# Patient Record
Sex: FEMALE | Race: WHITE | NOT HISPANIC OR LATINO
[De-identification: names, ages, dates, MRNs, and addresses within clinical notes are randomized per-mention and may not be internally consistent; named-entity substitution may affect disease eponyms.]

---

## 2016-12-20 RX ORDER — SCOPALAMINE 1 MG/3D
1.5 PATCH, EXTENDED RELEASE TRANSDERMAL ONCE
Qty: 0 | Refills: 0 | Status: COMPLETED | OUTPATIENT
Start: 2017-01-04 | End: 2017-01-04

## 2016-12-20 RX ORDER — CELECOXIB 200 MG/1
400 CAPSULE ORAL ONCE
Qty: 0 | Refills: 0 | Status: COMPLETED | OUTPATIENT
Start: 2017-01-04 | End: 2017-01-04

## 2016-12-20 RX ORDER — GABAPENTIN 400 MG/1
300 CAPSULE ORAL ONCE
Qty: 0 | Refills: 0 | Status: COMPLETED | OUTPATIENT
Start: 2017-01-04 | End: 2017-01-04

## 2016-12-21 RX ORDER — OXYCODONE HYDROCHLORIDE 5 MG/1
10 TABLET ORAL ONCE
Qty: 0 | Refills: 0 | Status: DISCONTINUED | OUTPATIENT
Start: 2017-01-04 | End: 2017-01-04

## 2017-01-03 ENCOUNTER — RESULT REVIEW (OUTPATIENT)
Age: 62
End: 2017-01-03

## 2017-01-04 ENCOUNTER — INPATIENT (INPATIENT)
Facility: HOSPITAL | Age: 62
LOS: 1 days | Discharge: HOME | DRG: 470 | End: 2017-01-06
Attending: ORTHOPAEDIC SURGERY | Admitting: ORTHOPAEDIC SURGERY
Payer: COMMERCIAL

## 2017-01-04 ENCOUNTER — APPOINTMENT (OUTPATIENT)
Dept: ORTHOPEDIC SURGERY | Facility: HOSPITAL | Age: 62
End: 2017-01-04

## 2017-01-04 ENCOUNTER — TRANSCRIPTION ENCOUNTER (OUTPATIENT)
Age: 62
End: 2017-01-04

## 2017-01-04 VITALS
DIASTOLIC BLOOD PRESSURE: 66 MMHG | OXYGEN SATURATION: 100 % | TEMPERATURE: 98 F | RESPIRATION RATE: 16 BRPM | HEART RATE: 70 BPM | WEIGHT: 190.04 LBS | HEIGHT: 63 IN | SYSTOLIC BLOOD PRESSURE: 113 MMHG

## 2017-01-04 DIAGNOSIS — Z96.7 PRESENCE OF OTHER BONE AND TENDON IMPLANTS: Chronic | ICD-10-CM

## 2017-01-04 DIAGNOSIS — I95.81 POSTPROCEDURAL HYPOTENSION: ICD-10-CM

## 2017-01-04 DIAGNOSIS — Z29.9 ENCOUNTER FOR PROPHYLACTIC MEASURES, UNSPECIFIED: ICD-10-CM

## 2017-01-04 DIAGNOSIS — Z98.891 HISTORY OF UTERINE SCAR FROM PREVIOUS SURGERY: Chronic | ICD-10-CM

## 2017-01-04 DIAGNOSIS — M16.11 UNILATERAL PRIMARY OSTEOARTHRITIS, RIGHT HIP: ICD-10-CM

## 2017-01-04 DIAGNOSIS — Z96.641 PRESENCE OF RIGHT ARTIFICIAL HIP JOINT: ICD-10-CM

## 2017-01-04 DIAGNOSIS — M85.80 OTHER SPECIFIED DISORDERS OF BONE DENSITY AND STRUCTURE, UNSPECIFIED SITE: ICD-10-CM

## 2017-01-04 DIAGNOSIS — R79.1 ABNORMAL COAGULATION PROFILE: ICD-10-CM

## 2017-01-04 PROCEDURE — 27130 TOTAL HIP ARTHROPLASTY: CPT | Mod: AS,RT

## 2017-01-04 PROCEDURE — 76001: CPT | Mod: 26,59

## 2017-01-04 PROCEDURE — 88311 DECALCIFY TISSUE: CPT | Mod: 26

## 2017-01-04 PROCEDURE — 73502 X-RAY EXAM HIP UNI 2-3 VIEWS: CPT | Mod: 26,RT

## 2017-01-04 PROCEDURE — 99223 1ST HOSP IP/OBS HIGH 75: CPT

## 2017-01-04 PROCEDURE — 27130 TOTAL HIP ARTHROPLASTY: CPT | Mod: RT

## 2017-01-04 PROCEDURE — 88305 TISSUE EXAM BY PATHOLOGIST: CPT | Mod: 26

## 2017-01-04 RX ORDER — SODIUM CHLORIDE 9 MG/ML
1000 INJECTION, SOLUTION INTRAVENOUS
Qty: 0 | Refills: 0 | Status: DISCONTINUED | OUTPATIENT
Start: 2017-01-04 | End: 2017-01-05

## 2017-01-04 RX ORDER — VANCOMYCIN HCL 1 G
1250 VIAL (EA) INTRAVENOUS ONCE
Qty: 0 | Refills: 0 | Status: COMPLETED | OUTPATIENT
Start: 2017-01-04 | End: 2017-01-04

## 2017-01-04 RX ORDER — ONDANSETRON 8 MG/1
4 TABLET, FILM COATED ORAL ONCE
Qty: 0 | Refills: 0 | Status: DISCONTINUED | OUTPATIENT
Start: 2017-01-04 | End: 2017-01-04

## 2017-01-04 RX ORDER — OXYCODONE HYDROCHLORIDE 5 MG/1
10 TABLET ORAL
Qty: 0 | Refills: 0 | Status: DISCONTINUED | OUTPATIENT
Start: 2017-01-04 | End: 2017-01-06

## 2017-01-04 RX ORDER — OXYCODONE HYDROCHLORIDE 5 MG/1
5 TABLET ORAL
Qty: 0 | Refills: 0 | Status: DISCONTINUED | OUTPATIENT
Start: 2017-01-04 | End: 2017-01-06

## 2017-01-04 RX ORDER — VANCOMYCIN HCL 1 G
1000 VIAL (EA) INTRAVENOUS
Qty: 0 | Refills: 0 | Status: COMPLETED | OUTPATIENT
Start: 2017-01-04 | End: 2017-01-04

## 2017-01-04 RX ORDER — HYDROMORPHONE HYDROCHLORIDE 2 MG/ML
2 INJECTION INTRAMUSCULAR; INTRAVENOUS; SUBCUTANEOUS
Qty: 0 | Refills: 0 | Status: DISCONTINUED | OUTPATIENT
Start: 2017-01-04 | End: 2017-01-06

## 2017-01-04 RX ORDER — ASPIRIN/CALCIUM CARB/MAGNESIUM 324 MG
325 TABLET ORAL
Qty: 0 | Refills: 0 | Status: DISCONTINUED | OUTPATIENT
Start: 2017-01-04 | End: 2017-01-06

## 2017-01-04 RX ORDER — CEFAZOLIN SODIUM 1 G
2000 VIAL (EA) INJECTION
Qty: 0 | Refills: 0 | Status: COMPLETED | OUTPATIENT
Start: 2017-01-04 | End: 2017-01-04

## 2017-01-04 RX ORDER — ACETAMINOPHEN 500 MG
650 TABLET ORAL EVERY 6 HOURS
Qty: 0 | Refills: 0 | Status: DISCONTINUED | OUTPATIENT
Start: 2017-01-04 | End: 2017-01-06

## 2017-01-04 RX ORDER — CELECOXIB 200 MG/1
200 CAPSULE ORAL
Qty: 0 | Refills: 0 | Status: DISCONTINUED | OUTPATIENT
Start: 2017-01-04 | End: 2017-01-06

## 2017-01-04 RX ORDER — TRANEXAMIC ACID 100 MG/ML
900 INJECTION, SOLUTION INTRAVENOUS ONCE
Qty: 0 | Refills: 0 | Status: DISCONTINUED | OUTPATIENT
Start: 2017-01-04 | End: 2017-01-04

## 2017-01-04 RX ORDER — ONDANSETRON 8 MG/1
4 TABLET, FILM COATED ORAL EVERY 6 HOURS
Qty: 0 | Refills: 0 | Status: DISCONTINUED | OUTPATIENT
Start: 2017-01-04 | End: 2017-01-06

## 2017-01-04 RX ORDER — CEFAZOLIN SODIUM 1 G
2000 VIAL (EA) INJECTION ONCE
Qty: 0 | Refills: 0 | Status: DISCONTINUED | OUTPATIENT
Start: 2017-01-04 | End: 2017-01-04

## 2017-01-04 RX ORDER — KETOROLAC TROMETHAMINE 30 MG/ML
15 SYRINGE (ML) INJECTION EVERY 6 HOURS
Qty: 0 | Refills: 0 | Status: DISCONTINUED | OUTPATIENT
Start: 2017-01-04 | End: 2017-01-05

## 2017-01-04 RX ORDER — ACETAMINOPHEN 500 MG
1000 TABLET ORAL ONCE
Qty: 0 | Refills: 0 | Status: DISCONTINUED | OUTPATIENT
Start: 2017-01-04 | End: 2017-01-04

## 2017-01-04 RX ORDER — OXYCODONE HYDROCHLORIDE 5 MG/1
10 TABLET ORAL EVERY 12 HOURS
Qty: 0 | Refills: 0 | Status: DISCONTINUED | OUTPATIENT
Start: 2017-01-04 | End: 2017-01-06

## 2017-01-04 RX ORDER — SODIUM CHLORIDE 9 MG/ML
1000 INJECTION, SOLUTION INTRAVENOUS
Qty: 0 | Refills: 0 | Status: DISCONTINUED | OUTPATIENT
Start: 2017-01-04 | End: 2017-01-04

## 2017-01-04 RX ORDER — SODIUM CHLORIDE 9 MG/ML
3 INJECTION INTRAMUSCULAR; INTRAVENOUS; SUBCUTANEOUS EVERY 8 HOURS
Qty: 0 | Refills: 0 | Status: DISCONTINUED | OUTPATIENT
Start: 2017-01-04 | End: 2017-01-04

## 2017-01-04 RX ORDER — SENNA PLUS 8.6 MG/1
2 TABLET ORAL AT BEDTIME
Qty: 0 | Refills: 0 | Status: DISCONTINUED | OUTPATIENT
Start: 2017-01-04 | End: 2017-01-06

## 2017-01-04 RX ORDER — LORATADINE 10 MG/1
10 TABLET ORAL DAILY
Qty: 0 | Refills: 0 | Status: DISCONTINUED | OUTPATIENT
Start: 2017-01-04 | End: 2017-01-06

## 2017-01-04 RX ORDER — HYDROMORPHONE HYDROCHLORIDE 2 MG/ML
0.5 INJECTION INTRAMUSCULAR; INTRAVENOUS; SUBCUTANEOUS
Qty: 0 | Refills: 0 | Status: DISCONTINUED | OUTPATIENT
Start: 2017-01-04 | End: 2017-01-06

## 2017-01-04 RX ORDER — ACETAMINOPHEN 500 MG
975 TABLET ORAL EVERY 8 HOURS
Qty: 0 | Refills: 0 | Status: DISCONTINUED | OUTPATIENT
Start: 2017-01-04 | End: 2017-01-06

## 2017-01-04 RX ORDER — DOCUSATE SODIUM 100 MG
100 CAPSULE ORAL THREE TIMES A DAY
Qty: 0 | Refills: 0 | Status: DISCONTINUED | OUTPATIENT
Start: 2017-01-04 | End: 2017-01-06

## 2017-01-04 RX ORDER — ACETAMINOPHEN 500 MG
1000 TABLET ORAL
Qty: 0 | Refills: 0 | Status: COMPLETED | OUTPATIENT
Start: 2017-01-04 | End: 2017-01-04

## 2017-01-04 RX ORDER — FENTANYL CITRATE 50 UG/ML
50 INJECTION INTRAVENOUS
Qty: 0 | Refills: 0 | Status: DISCONTINUED | OUTPATIENT
Start: 2017-01-04 | End: 2017-01-04

## 2017-01-04 RX ADMIN — SCOPALAMINE 1.5 MILLIGRAM(S): 1 PATCH, EXTENDED RELEASE TRANSDERMAL at 06:47

## 2017-01-04 RX ADMIN — CELECOXIB 200 MILLIGRAM(S): 200 CAPSULE ORAL at 18:14

## 2017-01-04 RX ADMIN — Medication 15 MILLIGRAM(S): at 23:57

## 2017-01-04 RX ADMIN — HYDROMORPHONE HYDROCHLORIDE 0.5 MILLIGRAM(S): 2 INJECTION INTRAMUSCULAR; INTRAVENOUS; SUBCUTANEOUS at 15:34

## 2017-01-04 RX ADMIN — Medication 100 MILLIGRAM(S): at 23:52

## 2017-01-04 RX ADMIN — OXYCODONE HYDROCHLORIDE 10 MILLIGRAM(S): 5 TABLET ORAL at 13:52

## 2017-01-04 RX ADMIN — OXYCODONE HYDROCHLORIDE 10 MILLIGRAM(S): 5 TABLET ORAL at 18:16

## 2017-01-04 RX ADMIN — Medication 250 MILLIGRAM(S): at 21:40

## 2017-01-04 RX ADMIN — OXYCODONE HYDROCHLORIDE 10 MILLIGRAM(S): 5 TABLET ORAL at 06:45

## 2017-01-04 RX ADMIN — FENTANYL CITRATE 50 MICROGRAM(S): 50 INJECTION INTRAVENOUS at 12:23

## 2017-01-04 RX ADMIN — FENTANYL CITRATE 50 MICROGRAM(S): 50 INJECTION INTRAVENOUS at 12:13

## 2017-01-04 RX ADMIN — Medication 166.67 MILLIGRAM(S): at 07:17

## 2017-01-04 RX ADMIN — Medication 100 MILLIGRAM(S): at 16:28

## 2017-01-04 RX ADMIN — Medication 325 MILLIGRAM(S): at 18:14

## 2017-01-04 RX ADMIN — OXYCODONE HYDROCHLORIDE 10 MILLIGRAM(S): 5 TABLET ORAL at 18:14

## 2017-01-04 RX ADMIN — Medication 1000 MILLIGRAM(S): at 08:15

## 2017-01-04 RX ADMIN — OXYCODONE HYDROCHLORIDE 5 MILLIGRAM(S): 5 TABLET ORAL at 21:40

## 2017-01-04 RX ADMIN — SODIUM CHLORIDE 125 MILLILITER(S): 9 INJECTION, SOLUTION INTRAVENOUS at 18:49

## 2017-01-04 RX ADMIN — FENTANYL CITRATE 50 MICROGRAM(S): 50 INJECTION INTRAVENOUS at 16:46

## 2017-01-04 RX ADMIN — OXYCODONE HYDROCHLORIDE 10 MILLIGRAM(S): 5 TABLET ORAL at 18:44

## 2017-01-04 RX ADMIN — CELECOXIB 200 MILLIGRAM(S): 200 CAPSULE ORAL at 18:47

## 2017-01-04 RX ADMIN — HYDROMORPHONE HYDROCHLORIDE 0.5 MILLIGRAM(S): 2 INJECTION INTRAMUSCULAR; INTRAVENOUS; SUBCUTANEOUS at 15:20

## 2017-01-04 RX ADMIN — CELECOXIB 400 MILLIGRAM(S): 200 CAPSULE ORAL at 06:47

## 2017-01-04 RX ADMIN — OXYCODONE HYDROCHLORIDE 5 MILLIGRAM(S): 5 TABLET ORAL at 22:40

## 2017-01-04 RX ADMIN — Medication 400 MILLIGRAM(S): at 19:52

## 2017-01-04 RX ADMIN — OXYCODONE HYDROCHLORIDE 10 MILLIGRAM(S): 5 TABLET ORAL at 13:42

## 2017-01-04 RX ADMIN — GABAPENTIN 300 MILLIGRAM(S): 400 CAPSULE ORAL at 06:47

## 2017-01-04 RX ADMIN — FENTANYL CITRATE 50 MICROGRAM(S): 50 INJECTION INTRAVENOUS at 16:36

## 2017-01-04 NOTE — CONSULT NOTE ADULT - SUBJECTIVE AND OBJECTIVE BOX
CC: Right hip pain x2 years    HPI:  62 y/o female with no PMH presents to [facility] with right hip pain x 2 years secondary to OA, s/p right BANDAR anterior approach POD #0.      PAST MEDICAL & SURGICAL HISTORY:  Osteopenia  Osteoarthritis  S/P ORIF (open reduction internal fixation) fracture: R ankle  S/P  section    FAMILY HISTORY:  No pertinent family history.    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    ALLERGIES:   NKDA    HOME MEDICATIONS:  Turmeric 1 tab orally once a day  Tart cherry daily     Probiotic Formula 1 cap orally once a day  Calcium 500+D oral tablet 1 tab orally once a day  Multivitamin 1 tab orally once a day  ZyrTEC 10 mg orally once a day As Needed    MEDICATIONS  (STANDING):  lactated ringers. 1000milliLiter(s) IV Continuous <Continuous>    MEDICATIONS  (PRN):  fentaNYL    Injectable 50MICROGram(s) IV Push every 5 minutes PRN Severe Pain  ondansetron Injectable 4milliGRAM(s) IV Push once PRN Nausea and/or Vomiting    REVIEW OF SYSTEMS:  CONSTITUTIONAL: No fever, weight loss, or fatigue  EYES: No eye pain, visual disturbances, or discharge  ENMT:  No difficulty hearing, tinnitus, vertigo; No sinus or throat pain  NECK: No pain or stiffness  RESPIRATORY: No cough, wheezing, chills or hemoptysis; No shortness of breath  CARDIOVASCULAR: No chest pain, palpitations, or leg swelling  GASTROINTESTINAL: No abdominal or epigastric pain. No nausea, vomiting, or hematemesis; No diarrhea or constipation. No melena or hematochezia.  GENITOURINARY: No dysuria, frequency, hematuria, or incontinence  NEUROLOGICAL: (+) Headache; No memory loss, loss of strength, numbness, or tremors  SKIN: No itching, burning, rashes, or lesions   LYMPH NODES: No enlarged glands  ENDOCRINE: No heat or cold intolerance; No hair loss  MUSCULOSKELETAL: No joint swelling; (+) Right hip pain; (+) back pain  PSYCHIATRIC: No depression, anxiety, mood swings, or difficulty sleeping  HEME/LYMPH: No easy bruising, or bleeding gums  ALLERGY AND IMMUNOLOGIC: No hives or eczema       Vital Signs Last 24 Hrs  T(C): 36.9, Max: 36.9 ( @ 10:57)  T(F): 98.4, Max: 98.4 (- @ 10:57)  HR: 61 (53 - 81)  BP: 102/64 (88/52 - 119/86)  BP(mean): --  RR: 16 (15 - 16)  SpO2: 97% (97% - 100%)    PHYSICAL EXAM:  GENERAL: NAD, well-groomed, well-developed  HEAD:  Atraumatic, Normocephalic  EYES: EOMI, PERRLA, conjunctiva and sclera clear  NECK: Supple, No JVD, Normal thyroid  NERVOUS SYSTEM:  Alert & Oriented X3, Good concentration; Motor Strength 5/5 B/L upper and lower extremities  CHEST/LUNG: Clear to auscultation bilaterally; No rales, rhonchi, wheezing, or rubs  HEART: Regular rate and rhythm; No murmurs, rubs, or gallops  ABDOMEN: Soft, Nontender, Nondistended; Bowel sounds present  EXTREMITIES:  2+ Peripheral Pulses, No clubbing, cyanosis, or edema; Right Hip with clean dressing.  LYMPH: No lymphadenopathy noted  SKIN: No rashes or lesions      LABS:  Complete Blood Count + Automated Diff (16 @ 11:29)    WBC Count: 5.21 K/uL    RBC Count: 4.58 M/uL    Hemoglobin: 13.4 g/dL    Hematocrit: 40.5 %    Mean Cell Volume: 88.4 fl    Mean Cell Hemoglobin: 29.3 pg    Mean Cell Hemoglobin Conc: 33.1 g/dL    Red Cell Distrib Width: 13.3 %    Platelet Count - Automated: 235 K/uL    Auto Neutrophil #: 2.8 K/uL    Auto Lymphocyte #: 1.8 K/uL    Auto Monocyte #: 0.3 K/uL    Auto Eosinophil #: 0.2 K/uL    Auto Basophil #: 0.0 K/uL    Auto Neutrophil %: 54.0: Differential percentages must be correlated with absolute numbers for  clinical significance. %    Auto Lymphocyte %: 35.3 %    Auto Monocyte %: 6.1 %    Auto Eosinophil %: 3.6 %    Auto Basophil %: 0.8 %    Prothrombin Time and INR, Plasma (16 @ 11:29)    Prothrombin Time, Plasma: 13.3 sec    INR: 1.21    Comprehensive Metabolic Panel (16 @ 11:29)    Sodium, Serum: 140 mmol/L    Potassium, Serum: 4.7 mmol/L    Chloride, Serum: 100 mmol/L    Carbon Dioxide, Serum: 26.0 mmol/L    Anion Gap, Serum: 14 mmol/L    Blood Urea Nitrogen, Serum: 17.0 mg/dL    Creatinine, Serum: 0.55 mg/dL    Glucose, Serum: 107 mg/dL    Calcium, Total Serum: 9.7 mg/dL    Protein Total, Serum: 7.7 g/dL    Albumin, Serum: 4.5 g/dL    Bilirubin Total, Serum: 0.6 mg/dL    Alkaline Phosphatase, Serum: 64 U/L    Aspartate Aminotransferase (AST/SGOT): 31 U/L    Alanine Aminotransferase (ALT/SGPT): 28 U/L      RADIOLOGY & ADDITIONAL STUDIES:  Right Hip x-ray  IMPRESSION: 2 portable views demonstrate baseline exam status post total   right hip arthroplasty and acetabular screw placement in good alignment.   There are sclerotic changes of the left greater trochanter which may be   posttraumatic, comparison to prior studies PMD: Rudy    CC: Right hip pain x2 years    HPI:  62 y/o female with no PMH presents to [facility] with right hip pain x 2 years secondary to OA, s/p right BANDAR anterior approach POD #0.      PAST MEDICAL & SURGICAL HISTORY:  Osteopenia  Osteoarthritis  S/P ORIF (open reduction internal fixation) fracture: R ankle  S/P  section    FAMILY HISTORY:  No pertinent family history.    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    ALLERGIES:   NKDA    HOME MEDICATIONS:  Turmeric 1 tab orally once a day  Tart cherry daily     Probiotic Formula 1 cap orally once a day  Calcium 500+D oral tablet 1 tab orally once a day  Multivitamin 1 tab orally once a day  ZyrTEC 10 mg orally once a day As Needed    MEDICATIONS  (STANDING):  lactated ringers. 1000milliLiter(s) IV Continuous <Continuous>    MEDICATIONS  (PRN):  fentaNYL    Injectable 50MICROGram(s) IV Push every 5 minutes PRN Severe Pain  ondansetron Injectable 4milliGRAM(s) IV Push once PRN Nausea and/or Vomiting    REVIEW OF SYSTEMS:  CONSTITUTIONAL: No fever, weight loss, or fatigue  EYES: No eye pain, visual disturbances, or discharge  ENMT:  No difficulty hearing, tinnitus, vertigo; No sinus or throat pain  NECK: No pain or stiffness  RESPIRATORY: No cough, wheezing, chills or hemoptysis; No shortness of breath  CARDIOVASCULAR: No chest pain, palpitations, or leg swelling  GASTROINTESTINAL: No abdominal or epigastric pain. No nausea, vomiting, or hematemesis; No diarrhea or constipation. No melena or hematochezia.  GENITOURINARY: No dysuria, frequency, hematuria, or incontinence  NEUROLOGICAL: (+) Headache; No memory loss, loss of strength, numbness, or tremors  SKIN: No itching, burning, rashes, or lesions   LYMPH NODES: No enlarged glands  ENDOCRINE: No heat or cold intolerance; No hair loss  MUSCULOSKELETAL: No joint swelling; (+) Right hip pain; (+) back pain  PSYCHIATRIC: No depression, anxiety, mood swings, or difficulty sleeping  HEME/LYMPH: No easy bruising, or bleeding gums  ALLERGY AND IMMUNOLOGIC: No hives or eczema       Vital Signs Last 24 Hrs  T(C): 36.9, Max: 36.9 ( @ 10:57)  T(F): 98.4, Max: 98.4 (01-04 @ 10:57)  HR: 61 (53 - 81)  BP: 102/64 (88/52 - 119/86)  BP(mean): --  RR: 16 (15 - 16)  SpO2: 97% (97% - 100%)    PHYSICAL EXAM:  GENERAL: NAD, well-groomed, well-developed  HEAD:  Atraumatic, Normocephalic  EYES: EOMI, PERRLA, conjunctiva and sclera clear  NECK: Supple, No JVD, Normal thyroid  NERVOUS SYSTEM:  Alert & Oriented X3, Good concentration; Motor Strength 5/5 B/L upper and lower extremities  CHEST/LUNG: Clear to auscultation bilaterally; No rales, rhonchi, wheezing, or rubs  HEART: Regular rate and rhythm; No murmurs, rubs, or gallops  ABDOMEN: Soft, Nontender, Nondistended; Bowel sounds present  EXTREMITIES:  2+ Peripheral Pulses, No clubbing, cyanosis, or edema; Right Hip with clean dressing.  LYMPH: No lymphadenopathy noted  SKIN: No rashes or lesions      LABS:  Complete Blood Count + Automated Diff (16 @ 11:29)    WBC Count: 5.21 K/uL    RBC Count: 4.58 M/uL    Hemoglobin: 13.4 g/dL    Hematocrit: 40.5 %    Mean Cell Volume: 88.4 fl    Mean Cell Hemoglobin: 29.3 pg    Mean Cell Hemoglobin Conc: 33.1 g/dL    Red Cell Distrib Width: 13.3 %    Platelet Count - Automated: 235 K/uL    Auto Neutrophil #: 2.8 K/uL    Auto Lymphocyte #: 1.8 K/uL    Auto Monocyte #: 0.3 K/uL    Auto Eosinophil #: 0.2 K/uL    Auto Basophil #: 0.0 K/uL    Auto Neutrophil %: 54.0: Differential percentages must be correlated with absolute numbers for  clinical significance. %    Auto Lymphocyte %: 35.3 %    Auto Monocyte %: 6.1 %    Auto Eosinophil %: 3.6 %    Auto Basophil %: 0.8 %    Prothrombin Time and INR, Plasma (16 @ 11:29)    Prothrombin Time, Plasma: 13.3 sec    INR: 1.21    Comprehensive Metabolic Panel (16 @ 11:29)    Sodium, Serum: 140 mmol/L    Potassium, Serum: 4.7 mmol/L    Chloride, Serum: 100 mmol/L    Carbon Dioxide, Serum: 26.0 mmol/L    Anion Gap, Serum: 14 mmol/L    Blood Urea Nitrogen, Serum: 17.0 mg/dL    Creatinine, Serum: 0.55 mg/dL    Glucose, Serum: 107 mg/dL    Calcium, Total Serum: 9.7 mg/dL    Protein Total, Serum: 7.7 g/dL    Albumin, Serum: 4.5 g/dL    Bilirubin Total, Serum: 0.6 mg/dL    Alkaline Phosphatase, Serum: 64 U/L    Aspartate Aminotransferase (AST/SGOT): 31 U/L    Alanine Aminotransferase (ALT/SGPT): 28 U/L      RADIOLOGY & ADDITIONAL STUDIES:  Right Hip x-ray  IMPRESSION: 2 portable views demonstrate baseline exam status post total   right hip arthroplasty and acetabular screw placement in good alignment.   There are sclerotic changes of the left greater trochanter which may be   posttraumatic, comparison to prior studies PMD: Rudy    CC: Right hip pain x 2 years    HPI:  60 y/o female with no PMH presents to [facility] with right hip pain x 2 years secondary to OA, s/p right BANDAR anterior approach POD #0.      PAST MEDICAL & SURGICAL HISTORY:    Osteopenia  Osteoarthritis  S/P ORIF (open reduction internal fixation) fracture: R ankle  S/P  section    FAMILY HISTORY:  No pertinent family history.    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    ALLERGIES:   NKDA    HOME MEDICATIONS:  Turmeric 1 tab orally once a day  Tart cherry daily     Probiotic Formula 1 cap orally once a day  Calcium 500+D oral tablet 1 tab orally once a day  Multivitamin 1 tab orally once a day  ZyrTEC 10 mg orally once a day As Needed    MEDICATIONS  (STANDING):  lactated ringers. 1000milliLiter(s) IV Continuous <Continuous>    MEDICATIONS  (PRN):  fentaNYL    Injectable 50MICROGram(s) IV Push every 5 minutes PRN Severe Pain  ondansetron Injectable 4milliGRAM(s) IV Push once PRN Nausea and/or Vomiting    REVIEW OF SYSTEMS:  CONSTITUTIONAL: No fever, weight loss, or fatigue  EYES: No eye pain, visual disturbances, or discharge  ENMT:  No difficulty hearing, tinnitus, vertigo; No sinus or throat pain  NECK: No pain or stiffness  RESPIRATORY: No cough, wheezing, chills or hemoptysis; No shortness of breath  CARDIOVASCULAR: No chest pain, palpitations, or leg swelling  GASTROINTESTINAL: No abdominal or epigastric pain. No nausea, vomiting, or hematemesis; No diarrhea or constipation. No melena or hematochezia.  GENITOURINARY: No dysuria, frequency, hematuria, or incontinence  NEUROLOGICAL: (+) Headache; No memory loss, loss of strength, numbness, or tremors  SKIN: No itching, burning, rashes, or lesions   LYMPH NODES: No enlarged glands  ENDOCRINE: No heat or cold intolerance; No hair loss  MUSCULOSKELETAL: No joint swelling; (+) Right hip pain; (+) back pain  PSYCHIATRIC: No depression, anxiety, mood swings, or difficulty sleeping  HEME/LYMPH: No easy bruising, or bleeding gums  ALLERGY AND IMMUNOLOGIC: No hives or eczema       Vital Signs Last 24 Hrs  T(C): 36.9, Max: 36.9 ( @ 10:57)  T(F): 98.4, Max: 98.4 (-04 @ 10:57)  HR: 61 (53 - 81)  BP: 102/64 (88/52 - 119/86)  BP(mean): --  RR: 16 (15 - 16)  SpO2: 97% (97% - 100%)    PHYSICAL EXAM:  GENERAL: NAD, well-groomed, well-developed  HEAD:  Atraumatic, Normocephalic  EYES: EOMI, PERRLA, conjunctiva and sclera clear  NECK: Supple, No JVD, Normal thyroid  NERVOUS SYSTEM:  Alert & Oriented X3, no focal deficit  CHEST/LUNG: Clear to auscultation bilaterally; No rales, rhonchi, wheezing, or rubs  HEART: Regular rate and rhythm; No murmurs, rubs, or gallops  ABDOMEN: Soft, Nontender, Nondistended; Bowel sounds present  EXTREMITIES:  2+ Peripheral Pulses, No clubbing, cyanosis, or edema; Right Hip with clean dressing.  LYMPH: No lymphadenopathy noted  SKIN: No rashes or lesions      LABS:  Complete Blood Count + Automated Diff (16 @ 11:29)    WBC Count: 5.21 K/uL    RBC Count: 4.58 M/uL    Hemoglobin: 13.4 g/dL    Hematocrit: 40.5 %    Mean Cell Volume: 88.4 fl    Mean Cell Hemoglobin: 29.3 pg    Mean Cell Hemoglobin Conc: 33.1 g/dL    Red Cell Distrib Width: 13.3 %    Platelet Count - Automated: 235 K/uL    Auto Neutrophil #: 2.8 K/uL    Auto Lymphocyte #: 1.8 K/uL    Auto Monocyte #: 0.3 K/uL    Auto Eosinophil #: 0.2 K/uL    Auto Basophil #: 0.0 K/uL    Auto Neutrophil %: 54.0: Differential percentages must be correlated with absolute numbers for  clinical significance. %    Auto Lymphocyte %: 35.3 %    Auto Monocyte %: 6.1 %    Auto Eosinophil %: 3.6 %    Auto Basophil %: 0.8 %    Prothrombin Time and INR, Plasma (16 @ 11:29)    Prothrombin Time, Plasma: 13.3 sec    INR: 1.21    Comprehensive Metabolic Panel (16 @ 11:29)    Sodium, Serum: 140 mmol/L    Potassium, Serum: 4.7 mmol/L    Chloride, Serum: 100 mmol/L    Carbon Dioxide, Serum: 26.0 mmol/L    Anion Gap, Serum: 14 mmol/L    Blood Urea Nitrogen, Serum: 17.0 mg/dL    Creatinine, Serum: 0.55 mg/dL    Glucose, Serum: 107 mg/dL    Calcium, Total Serum: 9.7 mg/dL    Protein Total, Serum: 7.7 g/dL    Albumin, Serum: 4.5 g/dL    Bilirubin Total, Serum: 0.6 mg/dL    Alkaline Phosphatase, Serum: 64 U/L    Aspartate Aminotransferase (AST/SGOT): 31 U/L    Alanine Aminotransferase (ALT/SGPT): 28 U/L      RADIOLOGY & ADDITIONAL STUDIES:  Right Hip x-ray  IMPRESSION: 2 portable views demonstrate baseline exam status post total   right hip arthroplasty and acetabular screw placement in good alignment.   There are sclerotic changes of the left greater trochanter which may be   posttraumatic, comparison to prior studies

## 2017-01-04 NOTE — CONSULT NOTE ADULT - PROBLEM SELECTOR RECOMMENDATION 2
Pain control.  PT eval.  Antibiotics, wound care and DVT prophylaxis as per Ortho.  Incentive Spirometry.  Avoid opioid induced constipation.

## 2017-01-04 NOTE — DISCHARGE NOTE ADULT - HOSPITAL COURSE
The patient underwent a RIGHT ANTERIOR TOTAL HIP REPLACEMENT on 1/4/2017. The patient received antibiotics consistent with SCIP guidelines. The patient underwent the procedure and had no intra-operative complications. Post-operatively, the patient was seen by medicine and PT. The patient received ASPIRIN for DVTP. The patient received pain medications per orthopedic pain management protocol and the pain was appropriately controlled. Patient was instructed on anterior total hip precautions by PT. The patient did not have any post-operative medical complications. The patient was discharged in stable condition.

## 2017-01-04 NOTE — DISCHARGE NOTE ADULT - CARE PROVIDER_API CALL
Anson Parisi), Orthopaedic Surgery  [address] [place] [id number]  Phone: [phone]  Fax: [phone]

## 2017-01-04 NOTE — DISCHARGE NOTE ADULT - NS AS ACTIVITY OBS
Showering allowed/Walking-Indoors allowed/Walking-Outdoors allowed/No Heavy lifting/straining/Stairs allowed

## 2017-01-04 NOTE — DISCHARGE NOTE ADULT - PLAN OF CARE
Decrease Pain, Improve ambulation and activities of daily living The patient will be seen in the office in 2 weeks for wound check. PLEASE CONTACT OFFICE TO ARRANGE FOLLOW-UP APPOINTMENT DATE. Patient may shower after post-op day #5. The dressing is to be removed on 10 (ten). IF THE DRESSING BECOMES SOILED BEFORE THE REMOVAL DATE, CHANGE WITH A SIMILAR DRESSING. IF THE DRESSING BECOMES STAINED WITH DISCHARGE, CONTACT THE OFFICE FOR FURTHER DIRECTIONS.  The patient will contact the office if the wound becomes red, has increasing pain, develops bleeding or discharge, an injury occurs, or has other concerns. The patient will continue PT consistent with anterior total hip replacement protocol. The patient will continue to practice anterior total hip precautions for a minimum of 6 week. The patient will continue aspirin 325mg twice daily for 6 weeks for DVTP.  The patient will take OXYCODONE AND TYLENOL for pain control and titrate according to prescription and patient needs. The patient will take Colace while taking oxycodone to prevent narcotic associated constipation.  Additionally, increase water intake (drink at least 8 glasses of water daily) and try adding fiber to the diet by eating fruits, vegetables and foods that are rich in grains. If constipation is experienced, contact the medical/primary care provider to discuss further treatment options. The patient is FULL weight bearing.

## 2017-01-04 NOTE — DISCHARGE NOTE ADULT - CARE PROVIDERS DIRECT ADDRESSES
[email or web address]

## 2017-01-04 NOTE — DISCHARGE NOTE ADULT - CARE PLAN
Principal Discharge DX:	Osteoarthritis of right hip, unspecified osteoarthritis type  Goal:	Decrease Pain, Improve ambulation and activities of daily living  Instructions for follow-up, activity and diet:	The patient will be seen in the office in 2 weeks for wound check. PLEASE CONTACT OFFICE TO ARRANGE FOLLOW-UP APPOINTMENT DATE. Patient may shower after post-op day #5. The dressing is to be removed on 10 (ten). IF THE DRESSING BECOMES SOILED BEFORE THE REMOVAL DATE, CHANGE WITH A SIMILAR DRESSING. IF THE DRESSING BECOMES STAINED WITH DISCHARGE, CONTACT THE OFFICE FOR FURTHER DIRECTIONS.  The patient will contact the office if the wound becomes red, has increasing pain, develops bleeding or discharge, an injury occurs, or has other concerns. The patient will continue PT consistent with anterior total hip replacement protocol. The patient will continue to practice anterior total hip precautions for a minimum of 6 week. The patient will continue aspirin 325mg twice daily for 6 weeks for DVTP.  The patient will take OXYCODONE AND TYLENOL for pain control and titrate according to prescription and patient needs. The patient will take Colace while taking oxycodone to prevent narcotic associated constipation.  Additionally, increase water intake (drink at least 8 glasses of water daily) and try adding fiber to the diet by eating fruits, vegetables and foods that are rich in grains. If constipation is experienced, contact the medical/primary care provider to discuss further treatment options. The patient is FULL weight bearing.

## 2017-01-04 NOTE — DISCHARGE NOTE ADULT - MEDICATION SUMMARY - MEDICATIONS TO TAKE
I will START or STAY ON the medications listed below when I get home from the hospital:    turmeric  --  by mouth once a day  -- Indication: For cont per pmd supervision    tart cherry  --     -- Indication: For cont per pmd supervision    aspirin 325 mg oral delayed release tablet  -- 1 tab(s) by mouth 2 times a day  -- Indication: For DVT ppx, continue for 6 weeks total    oxyCODONE 5 mg oral tablet  -- 1-2 tab(s) by mouth every 4 to 6 hours, As Needed -Mild Pain MDD:8  -- Indication: For Pain medication    ZyrTEC 10 mg oral tablet  -- 1 tab(s) by mouth once a day, As Needed  -- Indication: For cont per pmd supervision    docusate sodium 100 mg oral capsule  -- 1 cap(s) by mouth 3 times a day  -- Indication: For colace    Probiotic Formula oral capsule  -- 1 cap(s) by mouth once a day  -- Indication: For cont per pmd supervision    Calcium 500+D oral tablet, chewable  -- 1 tab(s) by mouth once a day  -- Indication: For cont per pmd supervision    multivitamin  -- 1 tab(s) by mouth once a day  -- Indication: For cont per pmd supervision

## 2017-01-04 NOTE — CONSULT NOTE ADULT - ASSESSMENT
60 y/o female with no PMH presents to [facility] with right hip pain x 2 years secondary to OA, s/p right BANDAR anterior approach POD #0.

## 2017-01-04 NOTE — PHYSICAL THERAPY INITIAL EVALUATION ADULT - ACTIVE RANGE OF MOTION EXAMINATION, REHAB EVAL
bilateral upper extremity Active ROM was WFL (within functional limits)/R hip flexion to 45deg in supine, R hip abduction limited/bilateral  lower extremity Active ROM was WFL (within functional limits)/deficits as listed below

## 2017-01-04 NOTE — DISCHARGE NOTE ADULT - PATIENT PORTAL LINK FT
“You can access the [facility] Patient Portal, offered by [facility], by registering with the following website: [email or web address]”

## 2017-01-05 LAB
ANION GAP SERPL CALC-SCNC: 10 MMOL/L — SIGNIFICANT CHANGE UP (ref 5–17)
ANION GAP SERPL CALC-SCNC: 11 MMOL/L — SIGNIFICANT CHANGE UP (ref 5–17)
BUN SERPL-MCNC: 11 MG/DL — SIGNIFICANT CHANGE UP (ref 8–20)
BUN SERPL-MCNC: 11 MG/DL — SIGNIFICANT CHANGE UP (ref 8–20)
CALCIUM SERPL-MCNC: 8.4 MG/DL — LOW (ref 8.6–10.2)
CALCIUM SERPL-MCNC: 8.5 MG/DL — LOW (ref 8.6–10.2)
CHLORIDE SERPL-SCNC: 104 MMOL/L — SIGNIFICANT CHANGE UP (ref 98–107)
CHLORIDE SERPL-SCNC: 95 MMOL/L — LOW (ref 98–107)
CO2 SERPL-SCNC: 26 MMOL/L — SIGNIFICANT CHANGE UP (ref 22–29)
CO2 SERPL-SCNC: 28 MMOL/L — SIGNIFICANT CHANGE UP (ref 22–29)
CREAT SERPL-MCNC: 0.55 MG/DL — SIGNIFICANT CHANGE UP (ref 0.5–1.3)
CREAT SERPL-MCNC: 0.6 MG/DL — SIGNIFICANT CHANGE UP (ref 0.5–1.3)
GLUCOSE SERPL-MCNC: 120 MG/DL — HIGH (ref 70–115)
GLUCOSE SERPL-MCNC: 99 MG/DL — SIGNIFICANT CHANGE UP (ref 70–115)
HCT VFR BLD CALC: 32.4 % — LOW (ref 37–47)
HGB BLD-MCNC: 10.8 G/DL — LOW (ref 12–16)
MCHC RBC-ENTMCNC: 29.3 PG — SIGNIFICANT CHANGE UP (ref 27–31)
MCHC RBC-ENTMCNC: 33.3 G/DL — SIGNIFICANT CHANGE UP (ref 32–36)
MCV RBC AUTO: 87.8 FL — SIGNIFICANT CHANGE UP (ref 81–99)
PLATELET # BLD AUTO: 191 K/UL — SIGNIFICANT CHANGE UP (ref 150–400)
POTASSIUM SERPL-MCNC: 3.9 MMOL/L — SIGNIFICANT CHANGE UP (ref 3.5–5.3)
POTASSIUM SERPL-MCNC: 4.2 MMOL/L — SIGNIFICANT CHANGE UP (ref 3.5–5.3)
POTASSIUM SERPL-SCNC: 3.9 MMOL/L — SIGNIFICANT CHANGE UP (ref 3.5–5.3)
POTASSIUM SERPL-SCNC: 4.2 MMOL/L — SIGNIFICANT CHANGE UP (ref 3.5–5.3)
RBC # BLD: 3.69 M/UL — LOW (ref 4.4–5.2)
RBC # FLD: 12.9 % — SIGNIFICANT CHANGE UP (ref 11–15.6)
SODIUM SERPL-SCNC: 132 MMOL/L — LOW (ref 135–145)
SODIUM SERPL-SCNC: 142 MMOL/L — SIGNIFICANT CHANGE UP (ref 135–145)
WBC # BLD: 7.96 K/UL — SIGNIFICANT CHANGE UP (ref 4.8–10.8)
WBC # FLD AUTO: 7.96 K/UL — SIGNIFICANT CHANGE UP (ref 4.8–10.8)

## 2017-01-05 PROCEDURE — 99233 SBSQ HOSP IP/OBS HIGH 50: CPT

## 2017-01-05 RX ORDER — DOCUSATE SODIUM 100 MG
1 CAPSULE ORAL
Qty: 30 | Refills: 0 | OUTPATIENT
Start: 2017-01-05

## 2017-01-05 RX ORDER — SODIUM CHLORIDE 9 MG/ML
1000 INJECTION INTRAMUSCULAR; INTRAVENOUS; SUBCUTANEOUS
Qty: 0 | Refills: 0 | Status: DISCONTINUED | OUTPATIENT
Start: 2017-01-05 | End: 2017-01-05

## 2017-01-05 RX ORDER — OXYCODONE HYDROCHLORIDE 5 MG/1
1 TABLET ORAL
Qty: 56 | Refills: 0 | OUTPATIENT
Start: 2017-01-05

## 2017-01-05 RX ORDER — SODIUM CHLORIDE 9 MG/ML
1000 INJECTION INTRAMUSCULAR; INTRAVENOUS; SUBCUTANEOUS
Qty: 0 | Refills: 0 | Status: DISCONTINUED | OUTPATIENT
Start: 2017-01-05 | End: 2017-01-06

## 2017-01-05 RX ORDER — ASPIRIN/CALCIUM CARB/MAGNESIUM 324 MG
1 TABLET ORAL
Qty: 90 | Refills: 0 | OUTPATIENT
Start: 2017-01-05

## 2017-01-05 RX ORDER — SODIUM CHLORIDE 9 MG/ML
500 INJECTION INTRAMUSCULAR; INTRAVENOUS; SUBCUTANEOUS ONCE
Qty: 0 | Refills: 0 | Status: COMPLETED | OUTPATIENT
Start: 2017-01-05 | End: 2017-01-05

## 2017-01-05 RX ADMIN — Medication 100 MILLIGRAM(S): at 06:02

## 2017-01-05 RX ADMIN — Medication 650 MILLIGRAM(S): at 12:59

## 2017-01-05 RX ADMIN — CELECOXIB 200 MILLIGRAM(S): 200 CAPSULE ORAL at 16:49

## 2017-01-05 RX ADMIN — Medication 15 MILLIGRAM(S): at 22:12

## 2017-01-05 RX ADMIN — Medication 975 MILLIGRAM(S): at 22:06

## 2017-01-05 RX ADMIN — CELECOXIB 200 MILLIGRAM(S): 200 CAPSULE ORAL at 16:55

## 2017-01-05 RX ADMIN — Medication 975 MILLIGRAM(S): at 13:00

## 2017-01-05 RX ADMIN — CELECOXIB 200 MILLIGRAM(S): 200 CAPSULE ORAL at 08:16

## 2017-01-05 RX ADMIN — Medication 325 MILLIGRAM(S): at 16:55

## 2017-01-05 RX ADMIN — Medication 15 MILLIGRAM(S): at 00:15

## 2017-01-05 RX ADMIN — Medication 15 MILLIGRAM(S): at 22:30

## 2017-01-05 RX ADMIN — Medication 100 MILLIGRAM(S): at 22:07

## 2017-01-05 RX ADMIN — SODIUM CHLORIDE 500 MILLILITER(S): 9 INJECTION INTRAMUSCULAR; INTRAVENOUS; SUBCUTANEOUS at 09:41

## 2017-01-05 RX ADMIN — Medication 100 MILLIGRAM(S): at 11:44

## 2017-01-05 RX ADMIN — LORATADINE 10 MILLIGRAM(S): 10 TABLET ORAL at 11:44

## 2017-01-05 RX ADMIN — Medication 325 MILLIGRAM(S): at 06:02

## 2017-01-05 RX ADMIN — Medication 1 TABLET(S): at 11:44

## 2017-01-05 RX ADMIN — Medication 975 MILLIGRAM(S): at 06:02

## 2017-01-05 RX ADMIN — Medication 1 TABLET(S): at 06:02

## 2017-01-05 NOTE — PROGRESS NOTE ADULT - SUBJECTIVE AND OBJECTIVE BOX
CC: Right hip pain x 2 years , this am c/o HA , lightheadedness ,poor oral intake, no other complaints .    HPI:  60 y/o female with no PMH presents to [facility] with right hip pain x 2 years secondary to OA, s/p right BANDAR anterior approach POD # 1      PAST MEDICAL & SURGICAL HISTORY:  Osteopenia  S/P ORIF (open reduction internal fixation) fracture: R ankle  S/P  section      MEDICATIONS  (STANDING):  aspirin enteric coated 325milliGRAM(s) Oral two times a day  acetaminophen   Tablet 975milliGRAM(s) Oral every 8 hours  celecoxib 200milliGRAM(s) Oral two times a day with meals  oxyCODONE ER Tablet 10milliGRAM(s) Oral every 12 hours  calcium carbonate 1250 mG + Vitamin D (OsCal 500 + D) 1Tablet(s) Oral three times a day  docusate sodium 100milliGRAM(s) Oral three times a day  multivitamin 1Tablet(s) Oral daily  loratadine 10milliGRAM(s) Oral daily  calcium carbonate 1250 mG + Vitamin D (OsCal 500 + D) 1Tablet(s) Oral daily  sodium chloride 0.9% Bolus 500milliLiter(s) IV Bolus once  sodium chloride 0.9%. 1000milliLiter(s) IV Continuous <Continuous>    MEDICATIONS  (PRN):  acetaminophen   Tablet 650milliGRAM(s) Oral every 6 hours PRN For Temp over 38.3 C (100.94 F)  ketorolac   Injectable 15milliGRAM(s) IV Push every 6 hours PRN Severe Pain (7 - 10)  oxyCODONE IR 5milliGRAM(s) Oral every 3 hours PRN Mild Pain  oxyCODONE IR 10milliGRAM(s) Oral every 3 hours PRN Moderate Pain  HYDROmorphone  Injectable 0.5milliGRAM(s) IV Push every 3 hours PRN Severe Pain  aluminum hydroxide/magnesium hydroxide/simethicone Suspension 30milliLiter(s) Oral four times a day PRN Indigestion  ondansetron Injectable 4milliGRAM(s) IV Push every 6 hours PRN Nausea and/or Vomiting  senna 2Tablet(s) Oral at bedtime PRN Constipation  HYDROmorphone   Tablet 2milliGRAM(s) Oral every 3 hours PRN Severe Pain (7 - 10), break thru pain      LABS:                          10.8   7.96  )-----------( 191      ( 2017 05:14 )             32.4     2017 05:14    132    |  95     |  11.0   ----------------------------<  120    4.2     |  26.0   |  0.55     Ca    8.4        2017 05:14        REVIEW OF SYSTEMS:    CONSTITUTIONAL: No fever, weight loss, or fatigue  EYES: No eye pain, visual disturbances, or discharge  ENMT:  No difficulty hearing, tinnitus, vertigo; No sinus or throat pain  NECK: No pain or stiffness  RESPIRATORY: No cough, wheezing, chills or hemoptysis; No shortness of breath  CARDIOVASCULAR: No chest pain, palpitations, or leg swelling , lightheadedness + ,  GASTROINTESTINAL: No abdominal or epigastric pain. No nausea, vomiting, or hematemesis; No diarrhea or constipation. No melena or hematochezia.  GENITOURINARY: No dysuria, frequency, hematuria, or incontinence  NEUROLOGICAL:  headache +, NO memory loss, loss of strength, numbness, or tremors  SKIN: No itching, burning, rashes, or lesions   LYMPH NODES: No enlarged glands  ENDOCRINE: No heat or cold intolerance; No hair loss  MUSCULOSKELETAL: R hip pain  PSYCHIATRIC: No depression, anxiety, mood swings, or difficulty sleeping  HEME/LYMPH: No easy bruising, or bleeding gums  ALLERGY AND IMMUNOLOGIC: No hives or eczema    Vital Signs Last 24 Hrs  T(C): 36.6, Max: 36.9 ( @ 10:57)  T(F): 97.8, Max: 98.4 ( @ 10:57)  HR: 69 (53 - 87)  BP: 97/64 (88/52 - 119/86)  BP(mean): --  RR: 18 (13 - 18)  SpO2: 92% (92% - 100%)  PHYSICAL EXAM:    GENERAL: NAD, well-groomed, well-developed  HEAD:  Atraumatic, Normocephalic  EYES: EOMI, PERRLA, conjunctiva and sclera clear  NECK: Supple, No JVD, Normal thyroid  NERVOUS SYSTEM:  Alert & Oriented X3, no focal deficit  CHEST/LUNG: CTA b/l ,  no  rales, rhonchi, wheezing, or rubs  HEART: Regular rate and rhythm; No murmurs, rubs, or gallops  ABDOMEN: Soft, Nontender, Nondistended; Bowel sounds present  EXTREMITIES:  2+ Peripheral Pulses, No clubbing, cyanosis, or edema  LYMPH: No lymphadenopathy noted  SKIN: No rashes or lesions

## 2017-01-05 NOTE — PROGRESS NOTE ADULT - SUBJECTIVE AND OBJECTIVE BOX
Pt seen, chart reviewed.  S/p Right Total Hip Arthroplasty, POD#1.  VSS.  Adequate pain control.  Resting comfortably.   Tolerating PO intake.  No N/V.    No anesthesia problems noted.

## 2017-01-05 NOTE — PROGRESS NOTE ADULT - SUBJECTIVE AND OBJECTIVE BOX
Patient seen and examined at bedside. Comfortable in bed, pain controlled. States walked with PT yesterday. Denies SOB/chest pain, abdominal pain, numbness/tingling.    Vital Signs Last 24 Hrs  T(C): 36.7, Max: 36.9 (01-04 @ 10:57)  T(F): 98.1, Max: 98.4 (01-04 @ 10:57)  HR: 74 (53 - 87)  BP: 96/67 (88/52 - 119/86)  BP(mean): --  RR: 18 (13 - 18)  SpO2: 94% (92% - 100%)    RLE: Dressing C/D/I. Sensation in tact to light touch. +dorsi/plantarflexion. Calf soft, NT B/L.                           10.8   7.96  )-----------( 191      ( 05 Jan 2017 05:14 )             32.4   05 Jan 2017 05:14    132    |  95     |  11.0   ----------------------------<  120    4.2     |  26.0   |  0.55     Ca    8.4        05 Jan 2017 05:14    A/P: 61 y.o F s/p right anterior BANDAR POD#1  - WBAT, anterior hip precautions  - DVTP (ASA 325mg BID)  - pain control  - D/C planning - home today if cleared by PT/medicine

## 2017-01-05 NOTE — PROGRESS NOTE ADULT - ASSESSMENT
60 y/o female with no PMH presents to [facility] with right hip pain x 2 years secondary to OA, s/p right BANDAR anterior approach POD # 1, c/o HA, lightheadedness , pain well controlled .    Problem/Recommendation - 1:  Problem: Primary osteoarthritis of right hip. Recommendation: S/P R BANDAR  Pain control.  PT eval..    Problem/Recommendation - 2:  ·  Problem: S/P hip replacement, right.  Recommendation: Pain control.  PT eval.  Antibiotics, wound care and DVT prophylaxis as per Ortho.  Incentive Spirometry.  Avoid opioid induced constipation.     Problem/Recommendation - 3:  ·  Problem: Postprocedural hypotension.  Recommendation: BP on the lower side , symptomatic with lightheadedness , will give NS bolus , change fluids to NS/    Problem/Recommendation - 4:  ·  Problem: Prophylactic measure.  Recommendation: ASA BID as per Ortho..     Problem/Recommendation - 5:  ·  Problem: Osteopenia.  Recommendation: Continue Calcium/vit D.   Problem/Plan - 6: Hyponatremia - likely secondary to hypovolemia, will change fluids to NS , repeat bmp at 4 pm    d/w Dr Parisi aware of patient condition . 62 y/o female with no PMH presents to [facility] with right hip pain x 2 years secondary to OA, s/p right BANDAR anterior approach POD # 1, c/o HA, lightheadedness , pain well controlled .    Problem/Recommendation - 1:  Problem: Primary osteoarthritis of right hip. Recommendation: S/P R BANDAR  Pain control.  PT eval..    Problem/Recommendation - 2:  ·  Problem: S/P hip replacement, right.  Recommendation: Pain control.  PT eval.  Antibiotics, wound care and DVT prophylaxis as per Ortho.  Incentive Spirometry.  Avoid opioid induced constipation.     Problem/Recommendation - 3:  ·  Problem: Postprocedural hypotension.  Recommendation: BP on the lower side , symptomatic with lightheadedness , will give NS bolus , change fluids to NS/    Problem/Recommendation - 4:  ·  Problem: Prophylactic measure.  Recommendation: ASA BID as per Ortho..     Problem/Recommendation - 5:  ·  Problem: Osteopenia -6:  Recommendation: Continue Calcium/vit D.   Problem/Plan - 7: Hyponatremia - likely secondary to hypovolemia, will change fluids to NS , repeat bmp at 4 pm    Problem/Plan - 8: ABLA - will monitor CBC    d/w Dr Parisi aware of patient condition .

## 2017-01-06 VITALS
DIASTOLIC BLOOD PRESSURE: 68 MMHG | OXYGEN SATURATION: 97 % | RESPIRATION RATE: 18 BRPM | HEART RATE: 74 BPM | SYSTOLIC BLOOD PRESSURE: 111 MMHG | TEMPERATURE: 98 F

## 2017-01-06 LAB
ANION GAP SERPL CALC-SCNC: 12 MMOL/L — SIGNIFICANT CHANGE UP (ref 5–17)
BUN SERPL-MCNC: 9 MG/DL — SIGNIFICANT CHANGE UP (ref 8–20)
CALCIUM SERPL-MCNC: 8.5 MG/DL — LOW (ref 8.6–10.2)
CHLORIDE SERPL-SCNC: 105 MMOL/L — SIGNIFICANT CHANGE UP (ref 98–107)
CO2 SERPL-SCNC: 25 MMOL/L — SIGNIFICANT CHANGE UP (ref 22–29)
CREAT SERPL-MCNC: 0.45 MG/DL — LOW (ref 0.5–1.3)
GLUCOSE SERPL-MCNC: 109 MG/DL — SIGNIFICANT CHANGE UP (ref 70–115)
HCT VFR BLD CALC: 32.5 % — LOW (ref 37–47)
HGB BLD-MCNC: 10.7 G/DL — LOW (ref 12–16)
MCHC RBC-ENTMCNC: 28.9 PG — SIGNIFICANT CHANGE UP (ref 27–31)
MCHC RBC-ENTMCNC: 32.9 G/DL — SIGNIFICANT CHANGE UP (ref 32–36)
MCV RBC AUTO: 87.8 FL — SIGNIFICANT CHANGE UP (ref 81–99)
PLATELET # BLD AUTO: 194 K/UL — SIGNIFICANT CHANGE UP (ref 150–400)
POTASSIUM SERPL-MCNC: 3.7 MMOL/L — SIGNIFICANT CHANGE UP (ref 3.5–5.3)
POTASSIUM SERPL-SCNC: 3.7 MMOL/L — SIGNIFICANT CHANGE UP (ref 3.5–5.3)
RBC # BLD: 3.7 M/UL — LOW (ref 4.4–5.2)
RBC # FLD: 13.1 % — SIGNIFICANT CHANGE UP (ref 11–15.6)
SODIUM SERPL-SCNC: 142 MMOL/L — SIGNIFICANT CHANGE UP (ref 135–145)
WBC # BLD: 7.45 K/UL — SIGNIFICANT CHANGE UP (ref 4.8–10.8)
WBC # FLD AUTO: 7.45 K/UL — SIGNIFICANT CHANGE UP (ref 4.8–10.8)

## 2017-01-06 PROCEDURE — 88311 DECALCIFY TISSUE: CPT

## 2017-01-06 PROCEDURE — 80048 BASIC METABOLIC PNL TOTAL CA: CPT

## 2017-01-06 PROCEDURE — 73501 X-RAY EXAM HIP UNI 1 VIEW: CPT

## 2017-01-06 PROCEDURE — 97116 GAIT TRAINING THERAPY: CPT

## 2017-01-06 PROCEDURE — 85027 COMPLETE CBC AUTOMATED: CPT

## 2017-01-06 PROCEDURE — 72170 X-RAY EXAM OF PELVIS: CPT

## 2017-01-06 PROCEDURE — C1776: CPT

## 2017-01-06 PROCEDURE — 97167 OT EVAL HIGH COMPLEX 60 MIN: CPT

## 2017-01-06 PROCEDURE — 76000 FLUOROSCOPY <1 HR PHYS/QHP: CPT

## 2017-01-06 PROCEDURE — 99232 SBSQ HOSP IP/OBS MODERATE 35: CPT

## 2017-01-06 PROCEDURE — 97530 THERAPEUTIC ACTIVITIES: CPT

## 2017-01-06 PROCEDURE — 88305 TISSUE EXAM BY PATHOLOGIST: CPT

## 2017-01-06 PROCEDURE — 97110 THERAPEUTIC EXERCISES: CPT

## 2017-01-06 PROCEDURE — 36415 COLL VENOUS BLD VENIPUNCTURE: CPT

## 2017-01-06 PROCEDURE — C1713: CPT

## 2017-01-06 PROCEDURE — 97163 PT EVAL HIGH COMPLEX 45 MIN: CPT

## 2017-01-06 RX ADMIN — Medication 975 MILLIGRAM(S): at 14:01

## 2017-01-06 RX ADMIN — LORATADINE 10 MILLIGRAM(S): 10 TABLET ORAL at 12:33

## 2017-01-06 RX ADMIN — OXYCODONE HYDROCHLORIDE 5 MILLIGRAM(S): 5 TABLET ORAL at 17:05

## 2017-01-06 RX ADMIN — CELECOXIB 200 MILLIGRAM(S): 200 CAPSULE ORAL at 08:39

## 2017-01-06 RX ADMIN — CELECOXIB 200 MILLIGRAM(S): 200 CAPSULE ORAL at 17:40

## 2017-01-06 RX ADMIN — OXYCODONE HYDROCHLORIDE 10 MILLIGRAM(S): 5 TABLET ORAL at 05:42

## 2017-01-06 RX ADMIN — OXYCODONE HYDROCHLORIDE 10 MILLIGRAM(S): 5 TABLET ORAL at 06:42

## 2017-01-06 RX ADMIN — OXYCODONE HYDROCHLORIDE 5 MILLIGRAM(S): 5 TABLET ORAL at 17:52

## 2017-01-06 RX ADMIN — CELECOXIB 200 MILLIGRAM(S): 200 CAPSULE ORAL at 09:49

## 2017-01-06 RX ADMIN — Medication 325 MILLIGRAM(S): at 05:42

## 2017-01-06 RX ADMIN — Medication 975 MILLIGRAM(S): at 05:37

## 2017-01-06 RX ADMIN — ONDANSETRON 4 MILLIGRAM(S): 8 TABLET, FILM COATED ORAL at 12:33

## 2017-01-06 RX ADMIN — CELECOXIB 200 MILLIGRAM(S): 200 CAPSULE ORAL at 16:55

## 2017-01-06 RX ADMIN — Medication 100 MILLIGRAM(S): at 05:37

## 2017-01-06 RX ADMIN — Medication 1 TABLET(S): at 12:33

## 2017-01-06 RX ADMIN — Medication 100 MILLIGRAM(S): at 14:00

## 2017-01-06 RX ADMIN — Medication 325 MILLIGRAM(S): at 17:10

## 2017-01-06 NOTE — PROGRESS NOTE ADULT - ATTENDING COMMENTS
Patient doing well this morning with pain controlled.  Walking to the bathroom.  Right hip dressing c/d/i, NVID RLE.  Patient would like to go home today if cleared by PT and medicine.  Anterior precautions, ECASA DVT ppx, WBAT.
Medically stable to d/c once cleared by physical therapist.

## 2017-01-06 NOTE — PROGRESS NOTE ADULT - SUBJECTIVE AND OBJECTIVE BOX
CC: Right hip pain x 2 years , this am c/o HA , lightheadedness ,poor oral intake, no other complaints .    HPI:  62 y/o female with no PMH presents to [facility] with right hip pain x 2 years secondary to OA, s/p right BANDAR anterior approach POD # 2      PAST MEDICAL & SURGICAL HISTORY:  Osteopenia  S/P ORIF (open reduction internal fixation) fracture: R ankle  S/P  section      MEDICATIONS  (STANDING):  aspirin enteric coated 325milliGRAM(s) Oral two times a day  acetaminophen   Tablet 975milliGRAM(s) Oral every 8 hours  celecoxib 200milliGRAM(s) Oral two times a day with meals  oxyCODONE ER Tablet 10milliGRAM(s) Oral every 12 hours  docusate sodium 100milliGRAM(s) Oral three times a day  multivitamin 1Tablet(s) Oral daily  loratadine 10milliGRAM(s) Oral daily  calcium carbonate 1250 mG + Vitamin D (OsCal 500 + D) 1Tablet(s) Oral daily  sodium chloride 0.9%. 1000milliLiter(s) IV Continuous <Continuous>    MEDICATIONS  (PRN):  acetaminophen   Tablet 650milliGRAM(s) Oral every 6 hours PRN For Temp over 38.3 C (100.94 F)  oxyCODONE IR 5milliGRAM(s) Oral every 3 hours PRN Mild Pain  oxyCODONE IR 10milliGRAM(s) Oral every 3 hours PRN Moderate Pain  HYDROmorphone  Injectable 0.5milliGRAM(s) IV Push every 3 hours PRN Severe Pain  aluminum hydroxide/magnesium hydroxide/simethicone Suspension 30milliLiter(s) Oral four times a day PRN Indigestion  ondansetron Injectable 4milliGRAM(s) IV Push every 6 hours PRN Nausea and/or Vomiting  bisacodyl Suppository 10milliGRAM(s) Rectal daily PRN If no bowel movement by POD#2  senna 2Tablet(s) Oral at bedtime PRN Constipation  HYDROmorphone   Tablet 2milliGRAM(s) Oral every 3 hours PRN Severe Pain (7 - 10), break thru pain      LABS:                          10.8   7.96  )-----------( 191      ( 2017 05:14 )             32.4     2017 17:09    142    |  104    |  11.0   ----------------------------<  99     3.9     |  28.0   |  0.60     Ca    8.5        2017 17:09            RADIOLOGY & ADDITIONAL TESTS:      REVIEW OF SYSTEMS:    CONSTITUTIONAL: No fever, weight loss, or fatigue  EYES: No eye pain, visual disturbances, or discharge  ENMT:  No difficulty hearing, tinnitus, vertigo; No sinus or throat pain  NECK: No pain or stiffness  RESPIRATORY: No cough, wheezing, chills or hemoptysis; No shortness of breath  CARDIOVASCULAR: No chest pain, palpitations, dizziness, or leg swelling  GASTROINTESTINAL: No abdominal or epigastric pain. No nausea, vomiting, or hematemesis; No diarrhea or constipation. No melena or hematochezia.  GENITOURINARY: No dysuria, frequency, hematuria, or incontinence  NEUROLOGICAL: No headaches, memory loss, loss of strength, numbness, or tremors  SKIN: No itching, burning, rashes, or lesions   LYMPH NODES: No enlarged glands  ENDOCRINE: No heat or cold intolerance; No hair loss  MUSCULOSKELETAL: R hip pain  PSYCHIATRIC: No depression, anxiety, mood swings, or difficulty sleeping  HEME/LYMPH: No easy bruising, or bleeding gums  ALLERGY AND IMMUNOLOGIC: No hives or eczema    Vital Signs Last 24 Hrs  T(C): 36.8, Max: 36.8 ( @ 18:06)  T(F): 98.2, Max: 98.2 ( @ 18:06)  HR: 73 (73 - 83)  BP: 108/66 (102/62 - 116/67)  BP(mean): --  RR: 18 (18 - 18)  SpO2: 96% (94% - 97%)  PHYSICAL EXAM:    GENERAL: NAD, well-groomed, well-developed  HEAD:  Atraumatic, Normocephalic  EYES: EOMI, PERRLA, conjunctiva and sclera clear  NECK: Supple, No JVD, Normal thyroid  NERVOUS SYSTEM:  Alert & Oriented X3, no focal deficit  CHEST/LUNG: CTA b/l ,  no  rales, rhonchi, wheezing, or rubs  HEART: Regular rate and rhythm; No murmurs, rubs, or gallops  ABDOMEN: Soft, Nontender, Nondistended; Bowel sounds present  EXTREMITIES:  2+ Peripheral Pulses, No clubbing, cyanosis, or edema, R hip dressing+ , C/D/I  LYMPH: No lymphadenopathy noted  SKIN: No rashes or lesions CC: Right hip pain x 2 years , s/p R BANDAR , POD #2 , feels better this am     HPI:  62 y/o female with no PMH presents to [facility] with right hip pain x 2 years secondary to OA, s/p right BANDAR anterior approach POD # 2      PAST MEDICAL & SURGICAL HISTORY:  Osteopenia  S/P ORIF (open reduction internal fixation) fracture: R ankle  S/P  section      MEDICATIONS  (STANDING):  aspirin enteric coated 325milliGRAM(s) Oral two times a day  acetaminophen   Tablet 975milliGRAM(s) Oral every 8 hours  celecoxib 200milliGRAM(s) Oral two times a day with meals  oxyCODONE ER Tablet 10milliGRAM(s) Oral every 12 hours  docusate sodium 100milliGRAM(s) Oral three times a day  multivitamin 1Tablet(s) Oral daily  loratadine 10milliGRAM(s) Oral daily  calcium carbonate 1250 mG + Vitamin D (OsCal 500 + D) 1Tablet(s) Oral daily  sodium chloride 0.9%. 1000milliLiter(s) IV Continuous <Continuous>    MEDICATIONS  (PRN):  acetaminophen   Tablet 650milliGRAM(s) Oral every 6 hours PRN For Temp over 38.3 C (100.94 F)  oxyCODONE IR 5milliGRAM(s) Oral every 3 hours PRN Mild Pain  oxyCODONE IR 10milliGRAM(s) Oral every 3 hours PRN Moderate Pain  HYDROmorphone  Injectable 0.5milliGRAM(s) IV Push every 3 hours PRN Severe Pain  aluminum hydroxide/magnesium hydroxide/simethicone Suspension 30milliLiter(s) Oral four times a day PRN Indigestion  ondansetron Injectable 4milliGRAM(s) IV Push every 6 hours PRN Nausea and/or Vomiting  bisacodyl Suppository 10milliGRAM(s) Rectal daily PRN If no bowel movement by POD#2  senna 2Tablet(s) Oral at bedtime PRN Constipation  HYDROmorphone   Tablet 2milliGRAM(s) Oral every 3 hours PRN Severe Pain (7 - 10), break thru pain      LABS:                          10.8   7.96  )-----------( 191      ( 2017 05:14 )             32.4     2017 17:09    142    |  104    |  11.0   ----------------------------<  99     3.9     |  28.0   |  0.60     Ca    8.5        2017 17:09    REVIEW OF SYSTEMS:    CONSTITUTIONAL: No fever, weight loss, or fatigue  EYES: No eye pain, visual disturbances, or discharge  ENMT:  No difficulty hearing, tinnitus, vertigo; No sinus or throat pain  NECK: No pain or stiffness  RESPIRATORY: No cough, wheezing, chills or hemoptysis; No shortness of breath  CARDIOVASCULAR: No chest pain, palpitations, dizziness, or leg swelling  GASTROINTESTINAL: No abdominal or epigastric pain. No nausea, vomiting, or hematemesis; No diarrhea or constipation. No melena or hematochezia.  GENITOURINARY: No dysuria, frequency, hematuria, or incontinence  NEUROLOGICAL: No headaches, memory loss, loss of strength, numbness, or tremors  SKIN: No itching, burning, rashes, or lesions   LYMPH NODES: No enlarged glands  ENDOCRINE: No heat or cold intolerance; No hair loss  MUSCULOSKELETAL: R hip pain, well controlled with pain meds  PSYCHIATRIC: No depression, anxiety, mood swings, or difficulty sleeping  HEME/LYMPH: No easy bruising, or bleeding gums  ALLERGY AND IMMUNOLOGIC: No hives or eczema    Vital Signs Last 24 Hrs  T(C): 36.8, Max: 36.8 ( @ 18:06)  T(F): 98.2, Max: 98.2 ( @ 18:06)  HR: 73 (73 - 83)  BP: 108/66 (102/62 - 116/67)  BP(mean): --  RR: 18 (18 - 18)  SpO2: 96% (94% - 97%)  PHYSICAL EXAM:    GENERAL: NAD, well-groomed, well-developed  HEAD:  Atraumatic, Normocephalic  EYES: EOMI, PERRLA, conjunctiva and sclera clear  NECK: Supple, No JVD, Normal thyroid  NERVOUS SYSTEM:  Alert & Oriented X3, no focal deficit  CHEST/LUNG: CTA b/l ,  no  rales, rhonchi, wheezing, or rubs  HEART: Regular rate and rhythm; No murmurs, rubs, or gallops  ABDOMEN: Soft, Nontender, Nondistended; Bowel sounds present  EXTREMITIES:  2+ Peripheral Pulses, No clubbing, cyanosis, or edema, R hip dressing+ , C/D/I  LYMPH: No lymphadenopathy noted  SKIN: No rashes or lesions

## 2017-01-06 NOTE — PROGRESS NOTE ADULT - SUBJECTIVE AND OBJECTIVE BOX
JANY CABEZAS    [id number]    History:  POD#2 s/p Right anterior hip replacement. Patient is doing well. The patient's pain is controlled using the prescribed pain medications. The patient is participating in physical therapy. Denies nausea, vomiting, chest pain, shortness of breath, abdominal pain or fever. No new complaints. No acute motor or sensory changes are reported.    Vital Signs Last 24 Hrs  T(C): 36.8, Max: 36.8 (01-05 @ 18:06)  T(F): 98.2, Max: 98.2 (01-05 @ 18:06)  HR: 73 (69 - 83)  BP: 108/66 (97/64 - 116/67)  BP(mean): --  RR: 18 (18 - 18)  SpO2: 96% (92% - 97%)  I&O's Summary                            10.8   7.96  )-----------( 191      ( 05 Jan 2017 05:14 )             32.4     05 Jan 2017 17:09    142    |  104    |  11.0   ----------------------------<  99     3.9     |  28.0   |  0.60     Ca    8.5        05 Jan 2017 17:09        MEDICATIONS  (STANDING):  aspirin enteric coated 325milliGRAM(s) Oral two times a day  acetaminophen   Tablet 975milliGRAM(s) Oral every 8 hours  celecoxib 200milliGRAM(s) Oral two times a day with meals  oxyCODONE ER Tablet 10milliGRAM(s) Oral every 12 hours  docusate sodium 100milliGRAM(s) Oral three times a day  multivitamin 1Tablet(s) Oral daily  loratadine 10milliGRAM(s) Oral daily  calcium carbonate 1250 mG + Vitamin D (OsCal 500 + D) 1Tablet(s) Oral daily  sodium chloride 0.9%. 1000milliLiter(s) IV Continuous <Continuous>    MEDICATIONS  (PRN):  acetaminophen   Tablet 650milliGRAM(s) Oral every 6 hours PRN For Temp over 38.3 C (100.94 F)  oxyCODONE IR 5milliGRAM(s) Oral every 3 hours PRN Mild Pain  oxyCODONE IR 10milliGRAM(s) Oral every 3 hours PRN Moderate Pain  HYDROmorphone  Injectable 0.5milliGRAM(s) IV Push every 3 hours PRN Severe Pain  aluminum hydroxide/magnesium hydroxide/simethicone Suspension 30milliLiter(s) Oral four times a day PRN Indigestion  ondansetron Injectable 4milliGRAM(s) IV Push every 6 hours PRN Nausea and/or Vomiting  bisacodyl Suppository 10milliGRAM(s) Rectal daily PRN If no bowel movement by POD#2  senna 2Tablet(s) Oral at bedtime PRN Constipation  HYDROmorphone   Tablet 2milliGRAM(s) Oral every 3 hours PRN Severe Pain (7 - 10), break thru pain      Physical exam: RLE, New dressing placed. The Wound is clean, dry and intact. No wound erythema, discharge, drainage is noted. Sensation to light touch is grossly intact without focal deficit and is symmetric bilaterally. No calf tenderness. Sensation to light touch is grossly intact distally. Motor function is grossly intact. No foot drop. 2+ dorsalis pedis pulse. Capillary refill is less than 2 seconds. No cyanosis.    Primary Orthopedic Assessment:  •   Secondary  Orthopedic Assessment(s):   •   Secondary  Medical Assessment(s):   •   Plan:   • DVT prophylaxis as prescribed, including use of compression devices and ankle pumps  • Continue physical therapy  • Non-weight bearing of the splinted extremity  • Continue splint as applied  • Elevation of the splinted extremity  • Pain control as clinically indicated  • Incentive spirometry encouraged  • Discharge planning – anticipated discharge is Home / subacute rehabilitation / acute rehabilitation JANY CABEZAS    [id number]    History:  POD#2 s/p Right anterior hip replacement. Patient is doing well. The patient's pain is controlled using the prescribed pain medications. The patient is participating in physical therapy. Denies nausea, vomiting, chest pain, shortness of breath, abdominal pain or fever. No new complaints. No acute motor or sensory changes are reported.    Vital Signs Last 24 Hrs  T(C): 36.8, Max: 36.8 (01-05 @ 18:06)  T(F): 98.2, Max: 98.2 (01-05 @ 18:06)  HR: 73 (69 - 83)  BP: 108/66 (97/64 - 116/67)  BP(mean): --  RR: 18 (18 - 18)  SpO2: 96% (92% - 97%)  I&O's Summary                            10.8   7.96  )-----------( 191      ( 05 Jan 2017 05:14 )             32.4     05 Jan 2017 17:09    142    |  104    |  11.0   ----------------------------<  99     3.9     |  28.0   |  0.60     Ca    8.5        05 Jan 2017 17:09        MEDICATIONS  (STANDING):  aspirin enteric coated 325milliGRAM(s) Oral two times a day  acetaminophen   Tablet 975milliGRAM(s) Oral every 8 hours  celecoxib 200milliGRAM(s) Oral two times a day with meals  oxyCODONE ER Tablet 10milliGRAM(s) Oral every 12 hours  docusate sodium 100milliGRAM(s) Oral three times a day  multivitamin 1Tablet(s) Oral daily  loratadine 10milliGRAM(s) Oral daily  calcium carbonate 1250 mG + Vitamin D (OsCal 500 + D) 1Tablet(s) Oral daily  sodium chloride 0.9%. 1000milliLiter(s) IV Continuous <Continuous>    MEDICATIONS  (PRN):  acetaminophen   Tablet 650milliGRAM(s) Oral every 6 hours PRN For Temp over 38.3 C (100.94 F)  oxyCODONE IR 5milliGRAM(s) Oral every 3 hours PRN Mild Pain  oxyCODONE IR 10milliGRAM(s) Oral every 3 hours PRN Moderate Pain  HYDROmorphone  Injectable 0.5milliGRAM(s) IV Push every 3 hours PRN Severe Pain  aluminum hydroxide/magnesium hydroxide/simethicone Suspension 30milliLiter(s) Oral four times a day PRN Indigestion  ondansetron Injectable 4milliGRAM(s) IV Push every 6 hours PRN Nausea and/or Vomiting  bisacodyl Suppository 10milliGRAM(s) Rectal daily PRN If no bowel movement by POD#2  senna 2Tablet(s) Oral at bedtime PRN Constipation  HYDROmorphone   Tablet 2milliGRAM(s) Oral every 3 hours PRN Severe Pain (7 - 10), break thru pain      Physical exam: RLE, New dressing placed. The Wound is clean, dry and intact. Thigh is soft, compressible, right. No wound erythema, discharge, drainage is noted. Sensation to light touch is grossly intact without focal deficit and is symmetric bilaterally. No calf tenderness. Sensation to light touch is grossly intact distally. Motor function is grossly intact. No foot drop. 2+ dorsalis pedis pulse. Capillary refill is less than 2 seconds. No cyanosis.    Primary Orthopedic Assessment:  •   Secondary  Orthopedic Assessment(s):   •   Secondary  Medical Assessment(s):   •   Plan:   • DVT prophylaxis as prescribed, including use of compression devices and ankle pumps  • Continue physical therapy  • Weight bearing as tolerated  • Pain control as clinically indicated  • Incentive spirometry encouraged  • Discharge planning – anticipated discharge is Home /

## 2017-01-06 NOTE — PROGRESS NOTE ADULT - ASSESSMENT
2 y/o female with no PMH presents to [facility] with right hip pain x 2 years secondary to OA, s/p right BANDAR anterior approach POD # 2, c/o HA, lightheadedness , pain well controlled .    Problem/Recommendation - 1:  Problem: Primary osteoarthritis of right hip. Recommendation: S/P R BANDAR  Pain control.  PT eval..    Problem/Recommendation - 2:  ·  Problem: S/P hip replacement, right.  Recommendation: Pain control.  PT eval.  Antibiotics, wound care and DVT prophylaxis as per Ortho.  Incentive Spirometry.  Avoid opioid induced constipation.     Problem/Recommendation - 3:  ·  Problem: Postprocedural hypotension.  Recommendation: BP on the lower side , symptomatic with lightheadedness , will give NS bolus , change fluids to NS/    Problem/Recommendation - 4:  ·  Problem: Prophylactic measure.  Recommendation: ASA BID as per Ortho..     Problem/Recommendation - 5:  ·  Problem: Osteopenia -6:  Recommendation: Continue Calcium/vit D.   Problem/Plan - 7: Hyponatremia - likely secondary to hypovolemia, will change fluids to NS , repeat bmp at 4 pm    Problem/Plan - 8: ABLA - will monitor CBC 2 y/o female with no PMH presents to [facility] with right hip pain x 2 years secondary to OA, s/p right BANDAR anterior approach POD # 2, feels better today .    Problem/Recommendation - 1:  Problem: Primary osteoarthritis of right hip. Recommendation: S/P R BANDAR  Pain control.  PT eval..    Problem/Recommendation - 2:  ·  Problem: S/P hip replacement, right.  Recommendation: Pain control.  PT eval.  Antibiotics, wound care and DVT prophylaxis as per Ortho.  Incentive Spirometry.  Avoid opioid induced constipation.     Problem/Recommendation - 3:  ·  Problem: Postprocedural hypotension.  Recommendation: improved with ivf    Problem/Recommendation - 4:  ·  Problem: Prophylactic measure.  Recommendation: ASA BID as per Ortho..     Problem/Recommendation - 5:  ·  Problem: Osteopenia -6:  Recommendation: Continue Calcium/vit D.   Problem/Plan - 7: Hyponatremia - likely secondary to hypovolemia, resolved with ivf, will follow up this am labs    Problem/Plan - 8: ABLA - asymptomatic , will follow up this am labs

## 2017-01-09 ENCOUNTER — OTHER (OUTPATIENT)
Age: 62
End: 2017-01-09

## 2017-01-09 LAB — SURGICAL PATHOLOGY FINAL REPORT - CH: SIGNIFICANT CHANGE UP

## 2017-01-11 ENCOUNTER — OTHER (OUTPATIENT)
Age: 62
End: 2017-01-11

## 2017-01-23 ENCOUNTER — APPOINTMENT (OUTPATIENT)
Dept: ORTHOPEDIC SURGERY | Facility: CLINIC | Age: 62
End: 2017-01-23

## 2017-01-23 VITALS
DIASTOLIC BLOOD PRESSURE: 74 MMHG | HEIGHT: 63 IN | HEART RATE: 80 BPM | SYSTOLIC BLOOD PRESSURE: 109 MMHG | BODY MASS INDEX: 33.66 KG/M2 | WEIGHT: 190 LBS

## 2017-02-01 ENCOUNTER — OTHER (OUTPATIENT)
Age: 62
End: 2017-02-01

## 2017-02-07 ENCOUNTER — OTHER (OUTPATIENT)
Age: 62
End: 2017-02-07

## 2017-02-15 ENCOUNTER — APPOINTMENT (OUTPATIENT)
Dept: ORTHOPEDIC SURGERY | Facility: CLINIC | Age: 62
End: 2017-02-15

## 2017-02-15 VITALS
HEIGHT: 63 IN | TEMPERATURE: 97.4 F | DIASTOLIC BLOOD PRESSURE: 71 MMHG | BODY MASS INDEX: 33.66 KG/M2 | SYSTOLIC BLOOD PRESSURE: 106 MMHG | HEART RATE: 69 BPM | WEIGHT: 190 LBS

## 2017-02-17 ENCOUNTER — OTHER (OUTPATIENT)
Age: 62
End: 2017-02-17

## 2017-03-27 ENCOUNTER — OTHER (OUTPATIENT)
Age: 62
End: 2017-03-27

## 2017-04-03 ENCOUNTER — APPOINTMENT (OUTPATIENT)
Dept: ORTHOPEDIC SURGERY | Facility: CLINIC | Age: 62
End: 2017-04-03

## 2017-04-03 VITALS
HEIGHT: 63 IN | HEART RATE: 64 BPM | SYSTOLIC BLOOD PRESSURE: 107 MMHG | BODY MASS INDEX: 33.66 KG/M2 | DIASTOLIC BLOOD PRESSURE: 73 MMHG | WEIGHT: 190 LBS

## 2017-04-03 DIAGNOSIS — Z47.1 AFTERCARE FOLLOWING JOINT REPLACEMENT SURGERY: ICD-10-CM

## 2017-04-03 DIAGNOSIS — Z96.641 AFTERCARE FOLLOWING JOINT REPLACEMENT SURGERY: ICD-10-CM

## 2017-04-03 RX ORDER — ASPIRIN 325 MG/1
325 TABLET ORAL
Qty: 90 | Refills: 0 | Status: DISCONTINUED | COMMUNITY
Start: 2017-01-05 | End: 2017-04-03

## 2017-04-03 RX ORDER — OXYCODONE 5 MG/1
5 TABLET ORAL
Qty: 56 | Refills: 0 | Status: DISCONTINUED | COMMUNITY
Start: 2017-01-05 | End: 2017-04-03

## 2017-05-08 ENCOUNTER — APPOINTMENT (OUTPATIENT)
Dept: ORTHOPEDIC SURGERY | Facility: CLINIC | Age: 62
End: 2017-05-08

## 2017-06-13 ENCOUNTER — RESULT REVIEW (OUTPATIENT)
Age: 62
End: 2017-06-13

## 2017-07-31 ENCOUNTER — RX RENEWAL (OUTPATIENT)
Age: 62
End: 2017-07-31

## 2017-08-03 ENCOUNTER — APPOINTMENT (OUTPATIENT)
Dept: MAMMOGRAPHY | Facility: CLINIC | Age: 62
End: 2017-08-03
Payer: COMMERCIAL

## 2017-08-03 ENCOUNTER — APPOINTMENT (OUTPATIENT)
Dept: RADIOLOGY | Facility: CLINIC | Age: 62
End: 2017-08-03
Payer: COMMERCIAL

## 2017-08-03 ENCOUNTER — OUTPATIENT (OUTPATIENT)
Dept: OUTPATIENT SERVICES | Facility: HOSPITAL | Age: 62
LOS: 1 days | End: 2017-08-03
Payer: COMMERCIAL

## 2017-08-03 DIAGNOSIS — Z98.891 HISTORY OF UTERINE SCAR FROM PREVIOUS SURGERY: Chronic | ICD-10-CM

## 2017-08-03 DIAGNOSIS — Z00.8 ENCOUNTER FOR OTHER GENERAL EXAMINATION: ICD-10-CM

## 2017-08-03 DIAGNOSIS — Z96.7 PRESENCE OF OTHER BONE AND TENDON IMPLANTS: Chronic | ICD-10-CM

## 2017-08-03 PROCEDURE — 77063 BREAST TOMOSYNTHESIS BI: CPT

## 2017-08-03 PROCEDURE — G0202: CPT | Mod: 26

## 2017-08-03 PROCEDURE — 77080 DXA BONE DENSITY AXIAL: CPT

## 2017-08-03 PROCEDURE — 77063 BREAST TOMOSYNTHESIS BI: CPT | Mod: 26

## 2017-08-03 PROCEDURE — 77067 SCR MAMMO BI INCL CAD: CPT

## 2017-08-03 PROCEDURE — 77080 DXA BONE DENSITY AXIAL: CPT | Mod: 26

## 2017-10-04 ENCOUNTER — OTHER (OUTPATIENT)
Age: 62
End: 2017-10-04

## 2018-06-14 ENCOUNTER — RESULT REVIEW (OUTPATIENT)
Age: 63
End: 2018-06-14

## 2018-10-24 ENCOUNTER — OTHER (OUTPATIENT)
Age: 63
End: 2018-10-24

## 2018-10-25 ENCOUNTER — OTHER (OUTPATIENT)
Age: 63
End: 2018-10-25

## 2018-11-01 ENCOUNTER — OTHER (OUTPATIENT)
Age: 63
End: 2018-11-01

## 2018-11-08 ENCOUNTER — APPOINTMENT (OUTPATIENT)
Dept: ORTHOPEDIC SURGERY | Facility: CLINIC | Age: 63
End: 2018-11-08
Payer: COMMERCIAL

## 2018-11-08 VITALS
DIASTOLIC BLOOD PRESSURE: 76 MMHG | HEIGHT: 63 IN | WEIGHT: 190 LBS | SYSTOLIC BLOOD PRESSURE: 115 MMHG | HEART RATE: 66 BPM | BODY MASS INDEX: 33.66 KG/M2

## 2018-11-08 DIAGNOSIS — Z96.641 PRESENCE OF RIGHT ARTIFICIAL HIP JOINT: ICD-10-CM

## 2018-11-08 PROCEDURE — 99213 OFFICE O/P EST LOW 20 MIN: CPT

## 2018-11-08 PROCEDURE — 73502 X-RAY EXAM HIP UNI 2-3 VIEWS: CPT | Mod: RT

## 2020-01-24 ENCOUNTER — RESULT REVIEW (OUTPATIENT)
Age: 65
End: 2020-01-24

## 2020-02-25 NOTE — PROGRESS NOTE ADULT - SUBJECTIVE AND OBJECTIVE BOX
Ortho Post Op Check    Name: JANY CABEZAS    MR #: [id number]    Procedure: Right Anterior Total Hip Arthroplasty  Surgeon: Anson Parisi    Pt comfortable without complaints, pain controlled, sitting up in hospital bed with friend at bedside  Denies CP, SOB, N/V, numbness/tingling     General Exam:  Vital Signs Last 24 Hrs  T(C): 36.7, Max: 36.7 (01-04 @ 18:06)  T(F): 98.1, Max: 98.1 (01-04 @ 18:06)  HR: 67 (61 - 69)  BP: 109/72 (91/57 - 113/48)  BP(mean): --  RR: 16 (13 - 17)  SpO2: 93% (93% - 100%)  Wt(kg): --    General: Pt Alert and oriented, NAD, controlled pain.  Dressings C/D/I. No bleeding.  Pulses: 2+ dorsalis pedis pulse. Cap refill < 2 sec.  Sensation: Grossly intact to light touch without deficit.  Motor: + EHL/FHL/TA/GS    Post-op X-Ray:  EXAM:  PELVIS                         EXAM:  XR HIP 1V RT                          PROCEDURE DATE:  01/04/2017      INTERPRETATION:  Pelvis and right hip    Indication: Status post right hip arthroplasty    IMPRESSION: 2 portable views demonstrate baseline exam status post total   right hip arthroplasty and acetabular screw placement in good alignment.   There are sclerotic changes of the left greater trochanter which may be   posttraumatic, comparison to prior studies and if indicated CT may be   considered for further evaluation.    FARSHAD URENA M.D., ATTENDING RADIOLOGIST  This document has been electronically signed. Jan 4 2017 12:34PM    A/P: 61yFemale POD#0 s/p   - Stable  - Pain Control  - DVT ppx: Aspirin 325mg Twice daily  - Post op abx: Ancef and Vancomycin  - PT eval pending  - Weight bearing status: WBAT yes

## 2021-07-04 NOTE — PHYSICAL THERAPY INITIAL EVALUATION ADULT - DIAGNOSIS, PT EVAL
Difficulty with transfers and ambulation 2/2 pain and decreased strength and ROM. Deniz Gonzalez(Resident)

## 2022-05-28 NOTE — OCCUPATIONAL THERAPY INITIAL EVALUATION ADULT - FINE MOTOR COORDINATION, RIGHT HAND THUMB/FINGER OPPOSITION SKILLS, OT EVAL
normal performance Implemented All Universal Safety Interventions:  [place] to call system. Call bell, personal items and telephone within reach. Instruct patient to call for assistance. Room bathroom lighting operational. Non-slip footwear when patient is off stretcher. Physically safe environment: no spills, clutter or unnecessary equipment. Stretcher in lowest position, wheels locked, appropriate side rails in place.
